# Patient Record
Sex: FEMALE | Race: AMERICAN INDIAN OR ALASKA NATIVE
[De-identification: names, ages, dates, MRNs, and addresses within clinical notes are randomized per-mention and may not be internally consistent; named-entity substitution may affect disease eponyms.]

---

## 2018-02-10 ENCOUNTER — HOSPITAL ENCOUNTER (EMERGENCY)
Dept: HOSPITAL 14 - H.ER | Age: 28
Discharge: HOME | End: 2018-02-10
Payer: MEDICAID

## 2018-02-10 VITALS
DIASTOLIC BLOOD PRESSURE: 79 MMHG | SYSTOLIC BLOOD PRESSURE: 133 MMHG | TEMPERATURE: 97.5 F | OXYGEN SATURATION: 99 % | HEART RATE: 78 BPM | RESPIRATION RATE: 16 BRPM

## 2018-02-10 DIAGNOSIS — O23.40: Primary | ICD-10-CM

## 2018-02-10 NOTE — ED PDOC
HPI: Female  Pain


Time Seen by Provider: 02/10/18 11:46


Chief Complaint (Nursing): Female Genitourinary


Chief Complaint (Provider): UTI


History Per: Patient


Additional Complaint(s): 





28 yo female, no PMH,  currently at 6 weeks pregnant presents to ED with 

complaints of vaginal bleeding noted only when wiping after urinating x 2 days 

now. Pt seen and evaluated in the ED at [facility] at 2 am and was diagnosed with a 

UTI after lab work and US were done. Pt reports that overnight the "bleeding" 

was brown and today it was more red which prompted repeat ED visit. Pt was 

given RX for Macrobid which she has yet to . 








No abdominal pain, no heav vaginal bleeding. First OB appointment on Tuesday of 

this week





Past Medical History


Reviewed: Nursing Documentation, Vital Signs


Vital Signs: 





 Last Vital Signs











Temp  97.5 F L  02/10/18 11:50


 


Pulse  78   02/10/18 11:50


 


Resp  16   02/10/18 11:50


 


BP  133/79   02/10/18 11:50


 


Pulse Ox  99   02/10/18 11:50














- Medical History


PMH: No Chronic Diseases





- Surgical History


Surgical History: No Surg Hx





- Family History


Family History: States: No Known Family Hx





- Living Arrangements


Living Arrangements: With Family





- Social History


Current smoker - smoking cessation education provided: No


Alcohol: None


Drugs: Denies





- Allergies


Allergies/Adverse Reactions: 


 Allergies











Allergy/AdvReac Type Severity Reaction Status Date / Time


 


coconut Allergy  RASH Verified 02/10/18 11:49














Review of Systems


ROS Statement: Except As Marked, All Systems Reviewed And Found Negative


Gastrointestinal: Negative for: Abdominal Pain


Genitourinary Female: Positive for: Vaginal Bleeding





Physical Exam





- Reviewed


Nursing Documentation Reviewed: Yes


Vital Signs Reviewed: Yes





- Physical Exam


Appears: Positive for: Well, Non-toxic, No Acute Distress


Head Exam: Positive for: ATRAUMATIC, NORMAL INSPECTION, NORMOCEPHALIC


Skin: Positive for: Normal Color, Warm, DRY


Eye Exam: Positive for: EOMI, Normal appearance, PERRL


ENT: Positive for: Normal ENT Inspection


Neck: Positive for: Normal, Painless ROM


Cardiovascular/Chest: Positive for: Regular Rate, Rhythm


Respiratory: Positive for: CNT, Normal Breath Sounds


Gastrointestinal/Abdominal: Positive for: Normal Exam, Bowel Sounds, Soft


Pelvic Exam: Positive for: Other (Pt deferred, said one was already done early 

am at [facility])


Back: Positive for: Normal Inspection


Extremity: Positive for: Normal ROM


Neurologic/Psych: Positive for: Alert, Oriented





- ECG


O2 Sat by Pulse Oximetry: 99





Medical Decision Making


Medical Decision Making: 





Dip (+) leuks and blood, nites neg


Preg (+)





Pt educated on vaginal bleeding in pregnancy, as well as signs and symptoms of 

UTI





Given Macrobid PO





US and Labs preformed at [facility] yesterday. 


Pt reports no abdominal pain at this time, bleeding only upon urination. 


Diagnostic repeat not clinically indicated at is time. 





Pt was unable to wait for dc paperwork to be complete, needed to leave for 

work. Left without signing paperwork 





Disposition





- Clinical Impression


Clinical Impression: 


 Urinary tract infection








- Patient ED Disposition


Is Patient to be Admitted: No





- Disposition


Disposition: Routine/Home


Disposition Time: 12:05


Condition: GOOD


Forms:  [facility] (English)





- POA


Present On Arrival: None

## 2018-02-11 ENCOUNTER — HOSPITAL ENCOUNTER (EMERGENCY)
Dept: HOSPITAL 14 - H.ER | Age: 28
Discharge: HOME | End: 2018-02-11
Payer: MEDICAID

## 2018-02-11 VITALS
SYSTOLIC BLOOD PRESSURE: 125 MMHG | RESPIRATION RATE: 19 BRPM | HEART RATE: 89 BPM | DIASTOLIC BLOOD PRESSURE: 73 MMHG | OXYGEN SATURATION: 99 %

## 2018-02-11 VITALS — TEMPERATURE: 98.3 F

## 2018-02-11 VITALS — BODY MASS INDEX: 28.1 KG/M2

## 2018-02-11 DIAGNOSIS — O20.0: Primary | ICD-10-CM

## 2018-02-11 LAB
BASOPHILS # BLD AUTO: 0 K/UL (ref 0–0.2)
BASOPHILS NFR BLD: 0.5 % (ref 0–2)
BUN SERPL-MCNC: 15 MG/DL (ref 7–17)
CALCIUM SERPL-MCNC: 8.8 MG/DL (ref 8.4–10.2)
EOSINOPHIL # BLD AUTO: 0.2 K/UL (ref 0–0.7)
EOSINOPHIL NFR BLD: 2.5 % (ref 0–4)
ERYTHROCYTE [DISTWIDTH] IN BLOOD BY AUTOMATED COUNT: 13.5 % (ref 11.5–14.5)
GFR NON-AFRICAN AMERICAN: > 60
HGB BLD-MCNC: 12.7 G/DL (ref 12–16)
LYMPHOCYTES # BLD AUTO: 1.9 K/UL (ref 1–4.3)
LYMPHOCYTES NFR BLD AUTO: 20.3 % (ref 20–40)
MCH RBC QN AUTO: 29.1 PG (ref 27–31)
MCHC RBC AUTO-ENTMCNC: 32.8 G/DL (ref 33–37)
MCV RBC AUTO: 88.7 FL (ref 81–99)
MONOCYTES # BLD: 0.7 K/UL (ref 0–0.8)
MONOCYTES NFR BLD: 7 % (ref 0–10)
NEUTROPHILS # BLD: 6.7 K/UL (ref 1.8–7)
NEUTROPHILS NFR BLD AUTO: 69.7 % (ref 50–75)
NRBC BLD AUTO-RTO: 0 % (ref 0–0)
PLATELET # BLD: 273 K/UL (ref 130–400)
PMV BLD AUTO: 7.7 FL (ref 7.2–11.7)
RBC # BLD AUTO: 4.35 MIL/UL (ref 3.8–5.2)
WBC # BLD AUTO: 9.6 K/UL (ref 4.8–10.8)

## 2018-02-11 NOTE — US
HISTORY:

vaginal bleeding



COMPARISON:

None available.



TECHNIQUE:

Transvaginal



FINDINGS:



UTERUS:

Measures 9.0 x 3.7 x 4.3 cm. Normal in size and appearance. No 

fibroid or other mass lesion seen.



ENDOMETRIUM:

Measures 11 mm in diameter. No intrauterine gestational sac 

identified.  Cannot exclude ectopic pregnancy.  Followup with serial 

beta HCG evaluation is advised. 



CERVIX:

Cervical nabothian cyst incidentally noted.



RIGHT OVARY:

Measures for 2.1 x 1.4 x 1.1 cm. No solid mass. Normal flow. 



LEFT OVARY:

Not visualize 



FREE FLUID:

No significant free fluid noted.



OTHER FINDINGS:

None. 



IMPRESSION:

No intrauterine gestation identified.  Cannot rule out ectopic 

gestation in the absence of an intrauterine gestational sac.  

Follow-up with serial beta HCG evaluation is advised. Otherwise 

unremarkable.

## 2018-02-11 NOTE — ED PDOC
HPI: Female  Pain


Time Seen by Provider: 18 08:03


Chief Complaint (Nursing): Female Genitourinary


Chief Complaint (Provider): Vaginal bleeding


History Per: Patient


History/Exam Limitations: no limitations


Additional Complaint(s): 





27 year old female,  A1, who presents to the emergency department with a 

complaint of vaginal bleeding that she noticed when using the bathroom this 

morning around 6am. Patient states she saw a blood clot the size of a quarter 

but had resolved since. Reports she also had on and off abdominal pain. Patient 

was seen in [facility] on Friday,  2018, had blood work 

and an ultrasound with normal results of early intrauterine pregnancy, and 

diagnosed with an urinary tract infection (prescribed antibiotics). Denies 

history of blood clots or fever. 





Past Medical History


Reviewed: Historical Data, Nursing Documentation, Vital Signs


Vital Signs: 





 Last Vital Signs











Temp  98.3 F   18 07:33


 


Pulse  81   18 07:33


 


Resp  16   18 07:33


 


BP  113/56 L  18 07:33


 


Pulse Ox  98   18 07:33














- Medical History


PMH: No Chronic Diseases





- Surgical History


Surgical History: No Surg Hx





- Family History


Family History: States: Unknown Family Hx





- Social History


Current smoker - smoking cessation education provided: No


Alcohol: None


Drugs: Denies





- Allergies


Allergies/Adverse Reactions: 


 Allergies











Allergy/AdvReac Type Severity Reaction Status Date / Time


 


coconut Allergy  RASH Verified 18 07:39














Review of Systems


ROS Statement: Except As Marked, All Systems Reviewed And Found Negative (As 

per HPI, otherwise negative)


Constitutional: Negative for: Fever


Gastrointestinal: Positive for: Abdominal Pain (on/off)


Genitourinary Female: Positive for: Vaginal Bleeding (Blood clot the size of a 

quarter )





Physical Exam





- Reviewed


Nursing Documentation Reviewed: Yes


Vital Signs Reviewed: Yes





- Physical Exam


Appears: Positive for: No Acute Distress


Head Exam: Positive for: NORMAL INSPECTION


Skin: Positive for: Normal Color, Warm, Dry


Gastrointestinal/Abdominal: Positive for: Normal Exam, Soft.  Negative for: 

Tenderness


Neurologic/Psych: Positive for: Alert, Oriented (x3)





- Laboratory Results


Result Diagrams: 


 18 08:24





 18 08:24





- ECG


O2 Sat by Pulse Oximetry: 98 (RA)


Pulse Ox Interpretation: Normal





Medical Decision Making


Medical Decision Making: 





Time: 821


Initial Impression: Vaginal Bleeding in setting of known pregnancy 


Initial Plan: 


--BMP


--Pregnancy Serum


--Urine DIP


--Urine Preg


--CBC w/ diff


--Transvaginal US


--Reevaluation 





Time: 824


--Beta HCG, Quant: .30





Time: 954


--Transvaginal US


FINDINGS:





UTERUS:


Measures 9.0 x 3.7 x 4.3 cm. Normal in size and appearance. No fibroid or other 

mass lesion seen.





ENDOMETRIUM:


Measures 11 mm in diameter. No intrauterine gestational sac identified.  Cannot 

exclude ectopic pregnancy.  Followup with serial beta HCG evaluation is 

advised. 





CERVIX:


Cervical nabothian cyst incidentally noted.





RIGHT OVARY:


Measures for 2.1 x 1.4 x 1.1 cm. No solid mass. Normal flow. 





LEFT OVARY:


Not visualize 





FREE FLUID:


No significant free fluid noted.





OTHER FINDINGS:


None. 





IMPRESSION:


No intrauterine gestation identified.  Cannot rule out ectopic gestation in the 

absence of an intrauterine gestational sac.  Follow-up with serial beta HCG 

evaluation is advised. Otherwise unremarkable.








Scribe Attestation:


Documented by Brenna Osborne, acting as a scribe for Diamond Rodgers MD.





Provider Scribe Attestation:


All medical record entries made by the Scribe were at my direction and 

personally dictated by me. I have reviewed the chart and agree that the record 

accurately reflects my personal performance of the history, physical exam, 

medical decision making, and the department course for this patient. I have 

also personally directed, reviewed, and agree with the discharge instructions 

and disposition.





patient informed of ultrasond report. confirmed with patient that her 

ultrasound from 2 days ago in [facility] showed pregnancy. Patient is advised to 

followup with her OBGyn for serial monitoring





Disposition





- Clinical Impression


Clinical Impression: 


 Threatened 








- Patient ED Disposition


Is Patient to be Admitted: No


Doctor Will See Patient In The: Office


Counseled Patient/Family Regarding: Diagnosis, Need For Followup





- Disposition


[facility]errals[facility] 


[facility]


Disposition: Routine/Home


Disposition Time: 10:28


Condition: STABLE


Additional Instructions: 


Please see your Ob/gyn or go to our [facility] in 2-3 days for 

followup testing. Please go to the nearest ER if you are having heavy bleeding 

or severe abdominal pain.   


Instructions:  Threatened Miscarriage (ED)


Forms:  [facility] (English)





- POA


Present On Arrival: None